# Patient Record
Sex: MALE | Race: WHITE | NOT HISPANIC OR LATINO | Employment: FULL TIME | URBAN - NONMETROPOLITAN AREA
[De-identification: names, ages, dates, MRNs, and addresses within clinical notes are randomized per-mention and may not be internally consistent; named-entity substitution may affect disease eponyms.]

---

## 2018-02-14 ENCOUNTER — DOCUMENTATION ONLY (OUTPATIENT)
Dept: FAMILY MEDICINE | Facility: OTHER | Age: 38
End: 2018-02-14

## 2019-04-25 ENCOUNTER — NURSE TRIAGE (OUTPATIENT)
Dept: FAMILY MEDICINE | Facility: OTHER | Age: 39
End: 2019-04-25
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
## 2019-04-25 RX ORDER — DOXYCYCLINE 100 MG/1
CAPSULE ORAL
Qty: 2 CAPSULE | Refills: 0 | Status: SHIPPED | OUTPATIENT
Start: 2019-04-25 | End: 2020-11-19

## 2019-04-25 NOTE — TELEPHONE ENCOUNTER
Last Tdap was 8/16/18.  Prescription for doxycycline 200 mg by mouth x 1 dose was sent to [facility].  Dilma Chapman MD on 4/25/2019 at 12:09 PM

## 2019-04-25 NOTE — TELEPHONE ENCOUNTER
Writer returned call to patient to discuss his concerns as noted in reason for call. Triage completed as noted below, with disposition for home care advice. Patient is in agreement with disposition as noted, however as area where tick was attached is reddened, patient does wonder about prophylactic abx. Writer is unable to order Rx as requested. Will route Rx request to PCP for her consideration/approval. Preferred pharmacy is teed up in this encounter. Patient would like a call back once a decision is made.    Additional Information    Negative: Sounds like a life-threatening emergency to the triager    Negative: Not a tick bite    Negative: [1] 2 to 14 days following tick bite AND [2] severe headache with fever occurs    Negative: [1] 2 to 14 days following tick bite AND [2] widespread rash with fever occurs    Negative: Patient sounds very sick or weak to the triager    Negative: [1] Fever AND [2] red area    Negative: [1] Fever AND [2] area is very tender to touch    Negative: [1] Red streak or red line AND [2] length > 2 inches (5 cm)    Negative: Can't remove live tick (after trying Care Advice)    Negative: Can't remove tick's head that was broken off in the skin (after trying Care Advice)    Negative: [1] 2 to 14 days following tick bite AND [2] fever AND [3] no rash or headache    Negative: [1] 2 to 14 days following tick bite AND [2] widespread rash or headache AND [3] no fever    Negative: [1] Red or very tender (to touch) area AND [2] started over 24 hours after the bite    Negative: Red ring or bull's-eye rash occurs at tick bite    Negative: [1] Probable deer tick AND [2] attached > 24 hours (or tick appears swollen, not flat) AND    [3] occurred in an area where Lyme disease is common    Negative: [1] Scab is present AND [2] it drains pus or increases in size AND [3] not improved after applying antibiotic ointment for 2 days    Negative: [1] Scab is present AND [2] it drains pus or increases in size  "(all triage questions negative)    Tick bite with no complications (all triage questions negative)    Answer Assessment - Initial Assessment Questions  1. TYPE of TICK: \"Is it a wood tick or a deer tick?\" If unsure, ask: \"What size was the tick?\" \"Did it look more like a watermelon seed or a poppy seed?\"       It was a deer tick as per patient's report. It was the size of a watermelon seed.  2. LOCATION: \"Where is the tick bite located?\"       The bite was located on the waistline.  3. ONSET: \"How long do you think the tick was attached before you removed it?\" (Hours or days)       He is unsure, but feels as though it was about 5 hours.  4. TETANUS: \"When was the last tetanus booster?\"       Feels as though it was about 5 years ago  5. PREGNANCY: \"Is there any chance you are pregnant?\" \"When was your last menstrual period?\"      N/A    Protocols used: TICK BITE-ADULT-    Umberto Alfaro RN on 4/25/2019 at 10:20 AM  "

## 2019-04-25 NOTE — TELEPHONE ENCOUNTER
Called and verified patients full name and . Patient notified of the new RX. Nothing further needed at this time.     Mihaela Epps LPN on 2019 at 12:40 PM

## 2020-11-19 ENCOUNTER — OFFICE VISIT (OUTPATIENT)
Dept: FAMILY MEDICINE | Facility: OTHER | Age: 40
End: 2020-11-19
Attending: PHYSICIAN ASSISTANT

## 2020-11-19 ENCOUNTER — NURSE TRIAGE (OUTPATIENT)
Dept: FAMILY MEDICINE | Facility: OTHER | Age: 40
End: 2020-11-19

## 2020-11-19 VITALS
BODY MASS INDEX: 30.48 KG/M2 | OXYGEN SATURATION: 97 % | SYSTOLIC BLOOD PRESSURE: 138 MMHG | TEMPERATURE: 98.7 F | RESPIRATION RATE: 12 BRPM | HEIGHT: 72 IN | WEIGHT: 225 LBS | DIASTOLIC BLOOD PRESSURE: 78 MMHG | HEART RATE: 68 BPM
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
## 2020-11-19 PROCEDURE — 99213 OFFICE O/P EST LOW 20 MIN: CPT | Performed by: PHYSICIAN ASSISTANT

## 2020-11-19 RX ORDER — DOXYCYCLINE HYCLATE 100 MG
100 TABLET ORAL 2 TIMES DAILY
Qty: 20 TABLET | Refills: 0 | Status: SHIPPED | OUTPATIENT
Start: 2020-11-19 | End: 2021-09-20

## 2020-11-19 ASSESSMENT — MIFFLIN-ST. JEOR: SCORE: 1973.59

## 2020-11-19 ASSESSMENT — PAIN SCALES - GENERAL: PAINLEVEL: NO PAIN (0)

## 2020-11-19 NOTE — PROGRESS NOTES
"  SUBJECTIVE:     HPI  Zach Armstrong is a 40 year old male who presents to clinic today for evaluation of tick bite. States two weeks ago his wife removed a deer velvet from his right middle back that was stuck. Tick was removed in its entirety. No development of overlying skin changes. Three days ago he began to feel \"off\" and developed night sweats. This morning he has some pain in the joints of his left hand that have since resolved. No associated fever/chills, headaches, other muscle or body aches, CNS symptoms, rash.         Review of Systems   Per HPI.     PAST MEDICAL HISTORY:   Past Medical History:   Diagnosis Date     Impaired fasting glucose     5/14/2014     Other specified abnormal findings of blood chemistry     5/14/2014     Pain in shoulder     2/2/2013     Viral wart     7/6/2010       PAST SURGICAL HISTORY:   Past Surgical History:   Procedure Laterality Date     BUNIONECTOMY      No Comments Provided     EXTRACTION(S) DENTAL      2007     OTHER SURGICAL HISTORY      2011,002605,OTHER,spur removal     OTHER SURGICAL HISTORY      ,ORCHIECTOMY,Right,Torsed testicle age 14       FAMILY HISTORY:   Family History   Problem Relation Age of Onset     Breast Cancer Mother         Cancer-breast     Hypertension Father         Hypertension     Diabetes No family hx of         Diabetes     Heart Disease No family hx of         Heart Disease     Anesthesia Reaction No family hx of         Anesthesia Problem       SOCIAL HISTORY:   Social History     Tobacco Use     Smoking status: Never Smoker     Smokeless tobacco: Former User   Substance Use Topics     Alcohol use: Yes     Alcohol/week: 12.0 standard drinks        Allergies   Allergen Reactions     Hops Oil Hives     Penicillins Hives and Rash     Current Outpatient Medications   Medication     doxycycline hyclate (VIBRA-TABS) 100 MG tablet     No current facility-administered medications for this visit.          OBJECTIVE:     /78   Pulse 68   " Temp 98.7  F (37.1  C)   Resp 12   Ht 1.829 m (6')   Wt 102.1 kg (225 lb)   SpO2 97%   BMI 30.52 kg/m    Body mass index is 30.52 kg/m .  Physical Exam  General: Pleasant, in no apparent distress.  Cardiovascular: Regular rate and rhythm with S1 equal to S2. No murmurs, friction rubs, or gallops.   Respiratory: Lungs are resonant and clear to auscultation bilaterally. No wheezes, crackles, or rhonchi.  Musculoskeletal: Back is straight, no tenderness to palpation of paraspinal and paravertebral muscles. Full ROM of back, neck, upper and lower extremities.  Neurologic Exam: Non-focal, symmetric DTRs, normal gross motor, tone coordination and no visible tremor.  Skin: No jaundice, pallor, rashes, or lesions. Healing tick bite on left middle back with no signs of infection or overlying erythema.  Psych: Appropriate mood and affect.        ASSESSMENT/PLAN:     (W57.XXXA) Tick bite, initial encounter  (primary encounter diagnosis)  Comment: Offered additional workup including lab but patient declined today. Will treat for presumed Lyme Disease given consistent symptoms after a known deer tick bite. Antibiotic as outlined below. Educated on medication, use, and side effects. Encouraged use of probiotic or yogurt while taking antibiotic. Call or return to the clinic if symptoms persist, worsen, or new symptoms arise.   Plan: doxycycline hyclate (VIBRA-TABS) 100 MG tablet               Josiane Dee PA-C  [facility]

## 2020-11-19 NOTE — NURSING NOTE
Patient presents to clinic with concerns about a tick bite he noticed on 11/8/2020 on back area.  Dorinda Hernandez LPN ....................  11/19/2020   1:40 PM

## 2020-11-19 NOTE — TELEPHONE ENCOUNTER
States he had a tick on him a few days ago and is now showing symptoms of lyme disease. Would like to know if he could get medication

## 2020-11-19 NOTE — TELEPHONE ENCOUNTER
Per Tick bite protocol, Pt does meet criteria for triage, as he was last seen >3 years ago (12/2016). Called and spoke to Patient after verifying last name and date of birth. Pt informed of this information and offered appointment. Pt transferred to scheduling line. Sophy Huerta RN .............. 11/19/2020  9:28 AM

## 2020-12-08 ENCOUNTER — TELEPHONE (OUTPATIENT)
Dept: FAMILY MEDICINE | Facility: OTHER | Age: 40
End: 2020-12-08

## 2020-12-08 NOTE — TELEPHONE ENCOUNTER
Notified patient of providers note.  Dorinda Hernandez LPN ....................  12/8/2020   1:10 PM

## 2020-12-08 NOTE — TELEPHONE ENCOUNTER
Patient was seen for a tick bite and given an antibiotic 2 weeks ago. Patient is having lyme's symptoms and wondering if he should have a lyme's test.     Bhavana Whelan on 12/8/2020 at 12:38 PM

## 2020-12-08 NOTE — TELEPHONE ENCOUNTER
I would recommend following up in clinic with his PCP if he continues to have symptoms despite antibiotic prophylactic for Lyme Disease.     Josiane Dee PA-C on 12/8/2020 at 1:04 PM

## 2021-08-11 DIAGNOSIS — Z02.89 ENCOUNTER FOR OCCUPATIONAL HEALTH ASSESSMENT: Primary | ICD-10-CM

## 2021-09-16 ENCOUNTER — TELEPHONE (OUTPATIENT)
Dept: CARDIOLOGY | Facility: OTHER | Age: 41
End: 2021-09-16

## 2021-09-16 NOTE — TELEPHONE ENCOUNTER
Patient verified .  Reminder call for stress test with instructions given. Emphasis on no caffeine 12 hours prior to the test. Patient verbalized understanding.

## 2021-09-20 ENCOUNTER — HOSPITAL ENCOUNTER (OUTPATIENT)
Dept: CARDIOLOGY | Facility: OTHER | Age: 41
End: 2021-09-20
Attending: INTERNAL MEDICINE

## 2021-09-20 DIAGNOSIS — Z02.89 ENCOUNTER FOR OCCUPATIONAL HEALTH ASSESSMENT: ICD-10-CM

## 2021-09-20 LAB — STRESS ECHO TARGET HR: 180

## 2021-09-20 PROCEDURE — 93015 CV STRESS TEST SUPVJ I&R: CPT | Performed by: INTERNAL MEDICINE

## 2021-09-20 PROCEDURE — 93017 CV STRESS TEST TRACING ONLY: CPT

## 2021-09-20 NOTE — PROGRESS NOTES
14:00  Patient arrived for an exercise stress test. The procedure was explained, medications, allergies and history reviewed. The patient was prepped for the stress test.  arrived, and the patient walked 13 minutes and 0 seconds. The patient tolerated the procedure. The patient left in stable condition. The patient was instructed that the stress test with MD note will be [facility] to [facility]. A SASHA was signed.  Please see the test results, for a full report.

## 2022-06-28 ENCOUNTER — OFFICE VISIT (OUTPATIENT)
Dept: FAMILY MEDICINE | Facility: OTHER | Age: 42
End: 2022-06-28
Attending: STUDENT IN AN ORGANIZED HEALTH CARE EDUCATION/TRAINING PROGRAM

## 2022-06-28 VITALS
SYSTOLIC BLOOD PRESSURE: 122 MMHG | HEART RATE: 75 BPM | BODY MASS INDEX: 30.92 KG/M2 | TEMPERATURE: 96.9 F | RESPIRATION RATE: 16 BRPM | WEIGHT: 228 LBS | DIASTOLIC BLOOD PRESSURE: 70 MMHG

## 2022-06-28 DIAGNOSIS — S61.218A LACERATION OF MIDDLE FINGER WITHOUT FOREIGN BODY WITHOUT DAMAGE TO NAIL, UNSPECIFIED LATERALITY, INITIAL ENCOUNTER: Primary | ICD-10-CM

## 2022-06-28 PROCEDURE — 12002 RPR S/N/AX/GEN/TRNK2.6-7.5CM: CPT | Performed by: STUDENT IN AN ORGANIZED HEALTH CARE EDUCATION/TRAINING PROGRAM

## 2022-06-28 PROCEDURE — 250N000009 HC RX 250: Performed by: STUDENT IN AN ORGANIZED HEALTH CARE EDUCATION/TRAINING PROGRAM

## 2022-06-28 PROCEDURE — 90471 IMMUNIZATION ADMIN: CPT | Performed by: STUDENT IN AN ORGANIZED HEALTH CARE EDUCATION/TRAINING PROGRAM

## 2022-06-28 PROCEDURE — 99213 OFFICE O/P EST LOW 20 MIN: CPT | Mod: 25 | Performed by: STUDENT IN AN ORGANIZED HEALTH CARE EDUCATION/TRAINING PROGRAM

## 2022-06-28 PROCEDURE — 90715 TDAP VACCINE 7 YRS/> IM: CPT | Performed by: STUDENT IN AN ORGANIZED HEALTH CARE EDUCATION/TRAINING PROGRAM

## 2022-06-28 RX ORDER — LIDOCAINE HYDROCHLORIDE 10 MG/ML
5 INJECTION, SOLUTION INFILTRATION; PERINEURAL ONCE
Status: COMPLETED | OUTPATIENT
Start: 2022-06-28 | End: 2022-06-28

## 2022-06-28 RX ADMIN — LIDOCAINE HYDROCHLORIDE 5 ML: 10 INJECTION, SOLUTION EPIDURAL; INFILTRATION; INTRACAUDAL; PERINEURAL at 18:40

## 2022-06-28 ASSESSMENT — PAIN SCALES - GENERAL: PAINLEVEL: NO PAIN (0)

## 2022-06-28 NOTE — PROGRESS NOTES
Mr. Armstrong is a 41 year old male who presents to the clinic for hand laceration    HPI  Patient was moving some sheet metal this evening when he cut the base of his middle finger.  He is out of date on his tetanus.    He has intact strength and sensation of his hand.  His wife urged him to come in to have it sutured.    Review of Systems     Reviewed and updated as needed this visit by Provider                     EXAM:   Vitals:    06/28/22 1814   BP: 122/70   BP Location: Left arm   Patient Position: Sitting   Cuff Size: Adult Regular   Pulse: 75   Resp: 16   Temp: 96.9  F (36.1  C)   TempSrc: Tympanic   Weight: 103.4 kg (228 lb)         BP Readings from Last 3 Encounters:   06/28/22 122/70   11/19/20 138/78   12/27/16 118/68      Wt Readings from Last 3 Encounters:   06/28/22 103.4 kg (228 lb)   11/19/20 102.1 kg (225 lb)   12/27/16 95.3 kg (210 lb)      Estimated body mass index is 30.92 kg/m  as calculated from the following:    Height as of 11/19/20: 1.829 m (6').    Weight as of this encounter: 103.4 kg (228 lb).     Physical Exam   General: Pleasant 41-year-old man sitting clinic no acute distress  MSK: Deep laceration full skin thickness of the middle finger.  Wound explored and no evidence of tendon rupture.  Intact strength and sensation of his fingers      ASSESSMENT AND PLAN:    ICD-10-CM    1. Laceration of middle finger without foreign body without damage to nail, unspecified laterality, initial encounter  S61.218A lidocaine 1 % injection 5 mL       Assessment/Plan:     Procedure: Skin was anesthetized using 1% lidocaine without epinephrine.  Wound was explored using forceps. 4cm laceration repaired with 5 running sutures and 1 simple interrupted suture were placed to approximate the wound.  Good approximation and hemostasis was achieved.    Recommended that he does not soak his hand for the next 5 days.  Remove sutures on his own or return to clinic in 10 to 14 days for suture removal.  Wound is clean  and no current indication for antibiotics.  Discussed indications to return to clinic for reevaluation          Electronically signed by:  Nelson Davenport MD on 6/28/2022  Internal Medicine  [facility]

## (undated) RX ORDER — LIDOCAINE HYDROCHLORIDE 10 MG/ML
INJECTION, SOLUTION EPIDURAL; INFILTRATION; INTRACAUDAL; PERINEURAL
Status: DISPENSED
Start: 2022-06-28